# Patient Record
Sex: FEMALE | Race: WHITE | NOT HISPANIC OR LATINO | Employment: OTHER | URBAN - METROPOLITAN AREA
[De-identification: names, ages, dates, MRNs, and addresses within clinical notes are randomized per-mention and may not be internally consistent; named-entity substitution may affect disease eponyms.]

---

## 2017-04-19 ENCOUNTER — CLINICAL SUPPORT (OUTPATIENT)
Dept: AUDIOLOGY | Facility: CLINIC | Age: 62
End: 2017-04-19
Payer: COMMERCIAL

## 2017-04-19 DIAGNOSIS — H90.3 HEARING LOSS, SENSORINEURAL, ASYMMETRICAL: Primary | ICD-10-CM

## 2017-04-19 NOTE — PROGRESS NOTES
Val Chase was seen 04/19/2017 for a hearing aid follow-up appointment.  Aids were cleaned and checked and found to be in good working order.  She had some issues a few weeks ago with the aids working intermittently, but over the phone I walked her through changing her filter and they have worked fine since.  No adjustments made today.  She will keep a note of any difficult listening environments moving forward and let me know before her cruise in July if she wants any adjustments then.      Patient will call for any future hearing aid follow-up appointments as needed.

## 2019-02-26 ENCOUNTER — OFFICE VISIT (OUTPATIENT)
Dept: OPHTHALMOLOGY | Facility: CLINIC | Age: 64
End: 2019-02-26
Payer: COMMERCIAL

## 2019-02-26 DIAGNOSIS — H52.4 MYOPIA WITH PRESBYOPIA OF BOTH EYES: ICD-10-CM

## 2019-02-26 DIAGNOSIS — Z13.5 GLAUCOMA SCREENING: ICD-10-CM

## 2019-02-26 DIAGNOSIS — H52.13 MYOPIA WITH PRESBYOPIA OF BOTH EYES: ICD-10-CM

## 2019-02-26 DIAGNOSIS — Z83.518 FAMILY HISTORY OF MACULAR DEGENERATION: ICD-10-CM

## 2019-02-26 DIAGNOSIS — H25.13 NUCLEAR SCLEROSIS OF BOTH EYES: Primary | ICD-10-CM

## 2019-02-26 DIAGNOSIS — I10 ESSENTIAL HYPERTENSION: ICD-10-CM

## 2019-02-26 PROCEDURE — 99999 PR PBB SHADOW E&M-EST. PATIENT-LVL II: CPT | Mod: PBBFAC,,, | Performed by: OPTOMETRIST

## 2019-02-26 PROCEDURE — 92014 PR EYE EXAM, EST PATIENT,COMPREHESV: ICD-10-PCS | Mod: S$GLB,,, | Performed by: OPTOMETRIST

## 2019-02-26 PROCEDURE — 92015 DETERMINE REFRACTIVE STATE: CPT | Mod: S$GLB,,, | Performed by: OPTOMETRIST

## 2019-02-26 PROCEDURE — 92015 PR REFRACTION: ICD-10-PCS | Mod: S$GLB,,, | Performed by: OPTOMETRIST

## 2019-02-26 PROCEDURE — 92014 COMPRE OPH EXAM EST PT 1/>: CPT | Mod: S$GLB,,, | Performed by: OPTOMETRIST

## 2019-02-26 PROCEDURE — 99999 PR PBB SHADOW E&M-EST. PATIENT-LVL II: ICD-10-PCS | Mod: PBBFAC,,, | Performed by: OPTOMETRIST

## 2019-02-26 RX ORDER — LEVOTHYROXINE SODIUM 75 UG/1
TABLET ORAL
Refills: 0 | COMMUNITY
Start: 2019-02-06

## 2019-02-26 NOTE — PROGRESS NOTES
HPI     Hypertensive Eye Exam      Additional comments: Yearly              Comments     Last seen by [facility] on [place]/26/16 for viral conjunctivitis  Last full eye exam with [facility] on 5/26/16  Patient here today for yearly eye exam  Has noticeable changes in vision since last eye exam. Patient states she   has problems focusing  Wears PAL glasses full-time updated 3 years ago  No other complaints  No drops  1. Dry eyes OU            Last edited by Sharmaine Glynn, PCT on 2/26/2019  1:27 PM. (History)              Assessment /Plan     For exam results, see Encounter Report.    Nuclear sclerosis of both eyes    Essential hypertension    Family history of macular degeneration    Glaucoma screening    Myopia with presbyopia of both eyes      Early nuclear sclerosis.  No hypertensive retinopathy in either eye.  Glaucoma screening and fundus exam normal.  Updated glasses prescription- patient is significantly less myopic.    Return to clinic 1 yr.

## 2019-09-25 ENCOUNTER — CLINICAL SUPPORT (OUTPATIENT)
Dept: AUDIOLOGY | Facility: CLINIC | Age: 64
End: 2019-09-25
Payer: COMMERCIAL

## 2019-09-25 DIAGNOSIS — H90.3 HEARING LOSS, SENSORINEURAL, ASYMMETRICAL: Primary | ICD-10-CM

## 2019-09-25 DIAGNOSIS — Z46.1 HEARING AID FITTING OR ADJUSTMENT: Primary | ICD-10-CM

## 2019-09-25 PROCEDURE — 92552 PURE TONE AUDIOMETRY AIR: CPT | Mod: 52,S$GLB,, | Performed by: AUDIOLOGIST

## 2019-09-25 PROCEDURE — 92556 SPEECH AUDIOMETRY COMPLETE: CPT | Mod: 52,S$GLB,, | Performed by: AUDIOLOGIST

## 2019-09-25 PROCEDURE — 92567 PR TYMPA2METRY: ICD-10-PCS | Mod: S$GLB,,, | Performed by: AUDIOLOGIST

## 2019-09-25 PROCEDURE — 92552 PR PURE TONE AUDIOMETRY, AIR: ICD-10-PCS | Mod: 52,S$GLB,, | Performed by: AUDIOLOGIST

## 2019-09-25 PROCEDURE — 92556 PR SPEECH AUDIOMETRY, COMPLETE: ICD-10-PCS | Mod: 52,S$GLB,, | Performed by: AUDIOLOGIST

## 2019-09-25 PROCEDURE — 92567 TYMPANOMETRY: CPT | Mod: S$GLB,,, | Performed by: AUDIOLOGIST

## 2019-09-25 NOTE — PROGRESS NOTES
Val Chase was seen 09/25/2019 for a hearing aid follow-up appointment.  She has not worn her aids consistently in a long time. Aids cleaned and checked and are in good working order. Aid re prescribed to today's audiogram. I discussed the importance of full time wear with her to maximize her benefit. She will bring home a few small open domes to try instead of her medium ones. This may help with the itching.     Also, she listened to some of the tinnitus support options and she preferred white noise generator. We decided we will not active this until she has worn the aids as is for the next few weeks.      Patient will return in 3 weeks.

## 2019-09-25 NOTE — PROGRESS NOTES
Val Chase was seen 09/25/2019 for an audiological evaluation.  Patient suspects decrease in her hearing in her left ear. She wears CROS system purchased and fitted at the clinic in 2016.     She has a history of no measurable hearing in the right ear, and  mild-to-moderate sensorineural hearing loss 6270-9176 Hz for the left ear.   Speech Reception Thresholds was 20 dBHL for the left ear.   Word recognition score was  excellent for the left ear.   Tympanograms were Type A, normal for the right ear and Type A, normal for the left ear.    Patient was counseled on the above findings.    Recommendations include:    1.  ENT followup  2.  Hearing aid follow-up today  3.  Wear hearing protective devices around loud noise  4.  Annual audiograms

## 2019-10-16 ENCOUNTER — CLINICAL SUPPORT (OUTPATIENT)
Dept: AUDIOLOGY | Facility: CLINIC | Age: 64
End: 2019-10-16
Payer: COMMERCIAL

## 2019-10-16 DIAGNOSIS — Z46.1 HEARING AID FITTING OR ADJUSTMENT: Primary | ICD-10-CM

## 2019-10-16 NOTE — PROGRESS NOTES
Val Chase was seen 10/16/2019 for a hearing aid follow-up appointment.  She has been wearing her aids much more consistently over the past 3 weeks. We experimented with the white noise generator and she preferred it without, due to not being able to have any noise on the dead (CROS) side.     Aids cleaned and checked and in good working order.      Patient will call for any future hearing aid follow-up appointments as needed.

## 2019-10-21 ENCOUNTER — OFFICE VISIT (OUTPATIENT)
Dept: OPHTHALMOLOGY | Facility: CLINIC | Age: 64
End: 2019-10-21
Payer: COMMERCIAL

## 2019-10-21 DIAGNOSIS — H52.13 MYOPIA WITH PRESBYOPIA OF BOTH EYES: ICD-10-CM

## 2019-10-21 DIAGNOSIS — H52.4 MYOPIA WITH PRESBYOPIA OF BOTH EYES: ICD-10-CM

## 2019-10-21 DIAGNOSIS — M45.9 ANKYLOSING SPONDYLITIS, UNSPECIFIED SITE OF SPINE: ICD-10-CM

## 2019-10-21 DIAGNOSIS — H25.13 NUCLEAR SCLEROSIS OF BOTH EYES: Primary | ICD-10-CM

## 2019-10-21 PROCEDURE — 99999 PR PBB SHADOW E&M-EST. PATIENT-LVL II: ICD-10-PCS | Mod: PBBFAC,,, | Performed by: OPTOMETRIST

## 2019-10-21 PROCEDURE — 92012 INTRM OPH EXAM EST PATIENT: CPT | Mod: S$GLB,,, | Performed by: OPTOMETRIST

## 2019-10-21 PROCEDURE — 99999 PR PBB SHADOW E&M-EST. PATIENT-LVL II: CPT | Mod: PBBFAC,,, | Performed by: OPTOMETRIST

## 2019-10-21 PROCEDURE — 92012 PR EYE EXAM, EST PATIENT,INTERMED: ICD-10-PCS | Mod: S$GLB,,, | Performed by: OPTOMETRIST

## 2019-10-21 NOTE — PROGRESS NOTES
HPI     Blurred Vision      Additional comments: Increased Blurred vision              Comments     LAst seen by [facility] on 2/26/19 for yearly eye exam *Ache in right eye   without redness lasted a few days.  Rheumatologist wanted to r/o iritis.   There was no iritis when she saw the ophthalmologist.  At around the same   time, she noticed blurred vision at distance, particularly at night in the   right eye.  She wants to know why her vision is blurred and if it is   related to iritis.  Patient here today for blurred vision OD  Patient states she was seeing fine until about 1 month ago  Patient states blurry vision increases at night when driving  States she saw her rheumatologist who sent her to an ophthalmologist   Ophthalmologist did not address the blurred vision   Patient states she has Ankylosing Spondylitis which causes her to have   [id number] a genetic Marker  1. NSC OU  Fhx Macular Degeneration  2.  Forme fruste keratoconus          Last edited by Raven Bullock, OD on 10/21/2019  3:34 PM. (History)            Assessment /Plan     For exam results, see Encounter Report.    Nuclear sclerosis of both eyes    Myopia with presbyopia of both eyes      Refractive error change most likely myopic shift secondary to nuclear sclerosis.    Updated glasses prescription.  Return to clinic in February for annual exam.

## 2021-04-28 ENCOUNTER — PATIENT MESSAGE (OUTPATIENT)
Dept: RESEARCH | Facility: HOSPITAL | Age: 66
End: 2021-04-28

## 2023-10-29 DIAGNOSIS — R92.0 MICROCALCIFICATION OF LEFT BREAST ON MAMMOGRAPHY: Primary | ICD-10-CM

## 2023-10-29 DIAGNOSIS — N60.12 FIBROCYSTIC BREAST CHANGES, BILATERAL: ICD-10-CM

## 2023-10-29 DIAGNOSIS — N60.11 FIBROCYSTIC BREAST CHANGES, BILATERAL: ICD-10-CM

## 2023-10-29 DIAGNOSIS — N63.11 MASS OF UPPER OUTER QUADRANT OF RIGHT BREAST: ICD-10-CM

## 2023-10-29 PROBLEM — Z80.3 FAMILY HISTORY OF BREAST CANCER: Status: ACTIVE | Noted: 2023-10-29

## 2023-10-29 NOTE — PROGRESS NOTES
[facility]  Tunde Aguirre MD, FACS  Cheng Burrell NP-ANNA      Date of Service: 11/13/2023      Chief Complaint:   Val Chase is a 68 y.o. female presenting today for her annual evaluation.  She is due for a mammogram. She reports no interval changes.     History of Present Illness:   Mrs. Chase first presented on January 7, 2013 due to a right breast mass and sonocore biopsy showed a fibroadenoma. In July 2015 she was noted with a new right breast mass adjacent to her preiously biopsied mass and sonocore biopsy revealed a benign fibroadenoma. In January 2016 she was noted with suspicious calcifications and stereotactic biopsy revealed benign changes.  MD:::Tunde Bruner MD; Royer Rodriguez MD.    Past Medical History:   Diagnosis Date    Chronic kidney disease, unspecified     Diverticulitis     Family history of breast cancer 10/29/2023    Fibrocystic breast changes, bilateral 10/29/2023    Fibromyalgia     Hypercholesterolemia     Hypertension     Hypothyroidism, unspecified     IBS (irritable bowel syndrome)     Mass of upper outer quadrant of right breast 10/29/2023    Microcalcification of left breast on mammography 10/29/2023    Muscle atrophy     PE (pulmonary thromboembolism) 02/23/2022    Pre-diabetes     Spondylosis     TIA on medication       Past Surgical History:   Procedure Laterality Date    BREAST BIOPSY Bilateral     CATARACT EXTRACTION, BILATERAL  12/2021    CHOLECYSTECTOMY      craniolotomy, brain tumor      DILATION AND CURETTAGE OF UTERUS      MUSCLE BIOPSY  06/2022    left thigh    RALH/BSO  12/01/2017    SINUS SURGERY      SQUAMOUS CELL CARCINOMA EXCISION          Current Outpatient Medications:     atorvastatin (LIPITOR) 40 MG tablet, Take 40 mg by mouth once daily., Disp: , Rfl:     bisoprolol (ZEBETA) 5 MG tablet, TAKE 1/2 TABLET BY MOUTH DAILY AT NIGHT, Disp: , Rfl:     clonazePAM (KLONOPIN) 1 MG tablet, Take 1 mg by  mouth., Disp: , Rfl:     EScitalopram oxalate (LEXAPRO) 10 MG tablet, Take 2 tablets by mouth every morning., Disp: , Rfl:     esomeprazole (NEXIUM) 40 MG capsule, Take 40 mg by mouth., Disp: , Rfl:     levothyroxine (SYNTHROID) 75 MCG tablet, TK 1 T PO QD IN THE MORNING OES, Disp: , Rfl: 0    midodrine (PROAMATINE) 2.5 MG Tab, Take 2.5 mg by mouth., Disp: , Rfl:     ruxolitinib (OPZELURA) 1.5 % Crea, Apply topically., Disp: , Rfl:     sucralfate (CARAFATE) 1 gram tablet, Take 1 g by mouth 4 (four) times daily as needed., Disp: , Rfl:     traZODone (DESYREL) 50 MG tablet, , Disp: , Rfl:     upadacitinib (RINVOQ) 15 mg 24 hr tablet, Take 15 mg by mouth., Disp: , Rfl:     XARELTO 10 mg Tab, Take 10 mg by mouth., Disp: , Rfl:    Review of patient's allergies indicates:   Allergen Reactions    Infliximab     Venlafaxine Hives and Other (See Comments)    Certolizumab pegol     Lisinopril Other (See Comments)    Tofacitinib      Bilateral PE 2022, no DVT, on 5 mg BID    Zolpidem Other (See Comments)    Duloxetine      Hives      Social History     Tobacco Use    Smoking status: Former    Smokeless tobacco: Never   Substance Use Topics    Alcohol use: Yes      Family History   Problem Relation Age of Onset    Macular degeneration Mother     Cataracts Mother     Hypertension Mother     Heart disease Father     Breast cancer Maternal Aunt     Macular degeneration Maternal Aunt     Cataracts Maternal Aunt     Macular degeneration Maternal Uncle     Cataracts Maternal Uncle     Macular degeneration Maternal Grandmother     Cataracts Maternal Grandmother         Review of Systems   Integumentary:  Negative for color change, rash, mole/lesion, breast mass, breast discharge and breast tenderness.   Breast: Negative for mass and tenderness     Physical Exam   Constitutional: She appears well-developed. She is cooperative.   HENT: Normocephalic.   Cardiovascular:  Normal rate and regular rhythm.            Pulmonary/Chest: She  exhibits no tenderness and no bony tenderness.   Abdominal: Soft. Normal appearance.   Musculoskeletal: Intact with no deficits  Neurological: She is alert.   Skin: No rash noted.   Breast and Chest Wall Evaluation:   Right breast exhibits no mass, no nipple discharge, no skin change and no tenderness.     Left breast exhibits no mass, no nipple discharge, no skin change and no tenderness.     Lymphadenopathy: No supraclavicular or axillary adenopathy.    MAMMOGRAM REPORT: She has some diffuse fibronodular tissue; there are no spiculated lesions, distortions or suspicious calcifications noted; NEM    ASSESSMENT and PLAN OF CARE     1. Microcalcification of left breast on mammography  Assessment & Plan:  We reviewed our findings today and her questions were answered.  She understands that her imaging and exams have remained stable (and show nothing concerning). She has developed no new areas of suspicious calcifications bilaterally.  She knows that any new or changed calcifications in the future may necessitate the recommendation for tissue sampling. She is comfortable being followed in a conservative fashion.      She understands the importance of monthly self-breast examination and knows to report any and all changes as they occur.          2. Mass of upper outer quadrant of right breast  Assessment & Plan:  We reviewed our findings today and her questions were answered.  She understands that her imaging and exams have remained stable (and show nothing concerning).  She is comfortable being followed in a conservative fashion.      She understands the importance of monthly self-breast examination and knows to report any and all changes as they occur.    NOTE:::We viewed her films together at today's visit.  We discussed the multiple views obtained and the important findings.  Even benign changes were mentioned and her questions were answered.  She knows that she may receive a formal letter or report from the  Radiologist.  She is to contact us if she has questions.        3. Fibrocystic breast changes, bilateral  Assessment & Plan:  We discussed our fibrocystic mastopathy protocol in detail. She knows that if she follows this protocol - that her symptoms should improve.  We discussed how breast pain is usually not associated with breast cancer, however, pain can be the presenting symptom with some cancers (but this could be coincidental). Still, if her pain does not improve in 8-12 weeks she should call us back for additional recommendations.        4. Family history of breast cancer  Assessment & Plan:  We discussed her family history and how it could impact her own future risks.  We discussed family vs. genetic history and the importance and implications of each.  Genetic Counseling/Testing was offered, and all questions answered to her satisfaction.  She knows that as additional family members are diagnosed - she will need to let us know as this may change follow up and imaging recommendations.    We had a discussion concerning Breast Cancer Risk Reduction and current NCCN Guidelines. She knows that her risk can be lowered slightly with a healthy lifestyle and minimal ETOH use. Being physically active will also help. She should reduce or stay away from OCPs and HRT as possible.         Medical Decision Making: It is my impression that this patient suffers all conditions contained in this medical document.  Each of these conditions did affect our plan of care and my medical decision making today.  It is my opinion that the medical decision making concerning this patient was of minimal  difficulty based on the aforementioned conditions.  Any further recommendations will be communicated to the patient by me.  I have reviewed and verified her allergies, list of medications, medical and surgical histories, social history, and a pertinent review of symptoms.     Follow up: 1 year and PRN    For: Physical Examination and MGM  (D) at [facility]

## 2023-10-29 NOTE — ASSESSMENT & PLAN NOTE
We reviewed our findings today and her questions were answered.  She understands that her imaging and exams have remained stable (and show nothing concerning). She has developed no new areas of suspicious calcifications bilaterally.  She knows that any new or changed calcifications in the future may necessitate the recommendation for tissue sampling. She is comfortable being followed in a conservative fashion.      She understands the importance of monthly self-breast examination and knows to report any and all changes as they occur.

## 2023-11-13 ENCOUNTER — OFFICE VISIT (OUTPATIENT)
Dept: SURGERY | Facility: CLINIC | Age: 68
End: 2023-11-13
Payer: COMMERCIAL

## 2023-11-13 VITALS — HEIGHT: 67 IN | WEIGHT: 200 LBS | BODY MASS INDEX: 31.39 KG/M2

## 2023-11-13 DIAGNOSIS — N60.11 FIBROCYSTIC BREAST CHANGES, BILATERAL: ICD-10-CM

## 2023-11-13 DIAGNOSIS — R92.0 MICROCALCIFICATION OF LEFT BREAST ON MAMMOGRAPHY: Primary | ICD-10-CM

## 2023-11-13 DIAGNOSIS — N63.11 MASS OF UPPER OUTER QUADRANT OF RIGHT BREAST: ICD-10-CM

## 2023-11-13 DIAGNOSIS — N60.12 FIBROCYSTIC BREAST CHANGES, BILATERAL: ICD-10-CM

## 2023-11-13 DIAGNOSIS — Z80.3 FAMILY HISTORY OF BREAST CANCER: ICD-10-CM

## 2023-11-13 PROCEDURE — 99213 PR OFFICE/OUTPT VISIT, EST, LEVL III, 20-29 MIN: ICD-10-PCS | Mod: S$PBB,,, | Performed by: SPECIALIST

## 2023-11-13 PROCEDURE — 99213 OFFICE O/P EST LOW 20 MIN: CPT | Mod: PBBFAC,PN | Performed by: SPECIALIST

## 2023-11-13 PROCEDURE — 99999 PR PBB SHADOW E&M-EST. PATIENT-LVL III: CPT | Mod: PBBFAC,,, | Performed by: SPECIALIST

## 2023-11-13 PROCEDURE — 99213 OFFICE O/P EST LOW 20 MIN: CPT | Mod: S$PBB,,, | Performed by: SPECIALIST

## 2023-11-13 PROCEDURE — 99999 PR PBB SHADOW E&M-EST. PATIENT-LVL III: ICD-10-PCS | Mod: PBBFAC,,, | Performed by: SPECIALIST

## 2023-11-13 RX ORDER — MEMANTINE HYDROCHLORIDE 10 MG/1
10 TABLET ORAL
COMMUNITY
Start: 2023-10-10 | End: 2024-03-27

## 2023-11-13 RX ORDER — VIBEGRON 75 MG/1
1 TABLET, FILM COATED ORAL
COMMUNITY
Start: 2023-10-22

## 2023-11-13 RX ORDER — DAPAGLIFLOZIN 10 MG/1
10 TABLET, FILM COATED ORAL
COMMUNITY
Start: 2023-10-23

## 2023-11-13 RX ORDER — BUPROPION HYDROCHLORIDE 300 MG/1
300 TABLET ORAL
COMMUNITY

## 2024-10-28 DIAGNOSIS — R92.0 MICROCALCIFICATION OF LEFT BREAST ON MAMMOGRAPHY: Primary | ICD-10-CM
